# Patient Record
(demographics unavailable — no encounter records)

---

## 2025-04-07 NOTE — HISTORY OF PRESENT ILLNESS
[FreeTextEntry1] : JARVIS MULLER  is a 68 year old  F PMH: CAD, palpitations, premature beats, PAT, AIVR, abnormal NST, atherosclerosis, MS, gait instability, obesity Last seen April 2024 blood work November 2024 creatinine 1.1 potassium 3.8 hemoglobin 11.6 last set of blood work reviewed today's EKG has sinus bradycardia poor R wave progression low voltage blood work March 2025 LDL 85 hemoglobin 12.2 creatinine 1.1 EKG and blood work reviewed.  Further cardiovascular testing is Gabbay symptoms.  Clinical follow-up will be scheduled 1 year. In the interim there have been no hospitalizations or procedures.  She uses exercise bike regularly.  Metoprolol was prev reduced d/t lea. She has lost some weight. She remains overweight. She is unable to lose further weight despite diet and lifestyle.  GLP1a was prev denied.  Saw endocrine re thyroid nodule biopsy was benign.   labs 9/2022 hgb 12, k 3.8, cr 0.96, ldl 67, tsh wnl 3/2023 Echocardiogram showed stable unchanged findings, normal LV systolic function. Carotid Doppler mild nonobstructive atherosclerosis thyroid nodules. Abdominal showed no aneurysm and there is a known liver cyst which was followed at outside office. Aug 2022 3 day monitor normal sinus rhythm avg 62 bpm rare ectopy, symptoms with one PVC 2021: 7 day monitor SR-SB avg 57 bpm. Rare APCs/seq APCs up to 8 beats, Rare PVCs, NSVT 4 beats. (stable) Cardiac MRI 12/21/20: LV is normal in size with normal EF 58%. Degraded image quality secondary to image noise and arrhythmia limits evaluation. LV myocardium appears normal in thickness. Question hypokinesis of the basal and mild anterior myocardium to be correlated with echo. No convincing abnormal LGE appreciated within the limitations of this exam. RV normal in size with normal RVEF. AI. MR. Focal lesion in the left hepatic lobe measuring up to 6.6 cm suggestion of multiple thin internal septations. This may reflect a complex cyst, however is incompletely characterized on this exam. Recommend dedicated liver imaging with MRI. Nuclear stress test 12/15/20: Lexiscan. No evidence of ischemia by EKG. Diaphragmatic artifact. The LV was hypertrophied. There is a small, mild defect in apical wall that is fixed suggestive of a prominent apical cleft- this region displays normal wall motion. EF >70%. CCTA 1/2020 calcium score 29 nonobstructive plaque. Limited study  Obesity Known CAD + HTN + HLD glp denied Adjunctive dietary and lifestyle modification measures have been reviewed.   HTN well controlled on current med rx  HLD cont statin rx  Thyroid nodule benign on biopsy, seen by endo  Palpations Ventricular ectopy- 1 run NSVT in 2021 Atrial tachycardia Lea Low burden of ectopy on recent monitor CMR no significant LGE noted. Continue beta blocker  Atherosclerosis Nonobstructive CAD CT 2020   Any questions and concerns were addressed and resolved.